# Patient Record
Sex: FEMALE | Race: WHITE
[De-identification: names, ages, dates, MRNs, and addresses within clinical notes are randomized per-mention and may not be internally consistent; named-entity substitution may affect disease eponyms.]

---

## 2017-04-03 ENCOUNTER — HOSPITAL ENCOUNTER (EMERGENCY)
Dept: HOSPITAL 63 - ER | Age: 22
Discharge: HOME | End: 2017-04-03
Payer: SELF-PAY

## 2017-04-03 VITALS — SYSTOLIC BLOOD PRESSURE: 95 MMHG | DIASTOLIC BLOOD PRESSURE: 67 MMHG

## 2017-04-03 VITALS — WEIGHT: 128 LBS | BODY MASS INDEX: 21.33 KG/M2 | HEIGHT: 65 IN

## 2017-04-03 DIAGNOSIS — Z91.018: ICD-10-CM

## 2017-04-03 DIAGNOSIS — N39.0: ICD-10-CM

## 2017-04-03 DIAGNOSIS — R53.81: ICD-10-CM

## 2017-04-03 DIAGNOSIS — Z87.891: ICD-10-CM

## 2017-04-03 DIAGNOSIS — D72.829: ICD-10-CM

## 2017-04-03 DIAGNOSIS — Z88.1: ICD-10-CM

## 2017-04-03 DIAGNOSIS — R10.84: Primary | ICD-10-CM

## 2017-04-03 DIAGNOSIS — N83.209: ICD-10-CM

## 2017-04-03 DIAGNOSIS — R11.2: ICD-10-CM

## 2017-04-03 LAB
ALBUMIN SERPL-MCNC: 3.8 G/DL (ref 3.4–5)
ALBUMIN/GLOB SERPL: 1.1 {RATIO} (ref 1–1.7)
ALP SERPL-CCNC: 59 U/L (ref 46–116)
ALT SERPL-CCNC: 17 U/L (ref 14–59)
ANION GAP SERPL CALC-SCNC: 8 MMOL/L (ref 6–14)
APTT PPP: YELLOW S
AST SERPL-CCNC: 12 U/L (ref 15–37)
BACTERIA #/AREA URNS HPF: (no result) /HPF
BASOPHILS # BLD AUTO: 0 X10^3/UL (ref 0–0.2)
BASOPHILS NFR BLD: 0 % (ref 0–3)
BILIRUB SERPL-MCNC: 0.7 MG/DL (ref 0.2–1)
BILIRUB UR QL STRIP: (no result)
BUN/CREAT SERPL: 17 (ref 6–20)
CA-I SERPL ISE-MCNC: 12 MG/DL (ref 7–20)
CALCIUM SERPL-MCNC: 8.5 MG/DL (ref 8.5–10.1)
CHLORIDE SERPL-SCNC: 106 MMOL/L (ref 98–107)
CO2 SERPL-SCNC: 28 MMOL/L (ref 21–32)
CREAT SERPL-MCNC: 0.7 MG/DL (ref 0.6–1)
EOSINOPHIL NFR BLD: 0 % (ref 0–3)
EOSINOPHIL NFR BLD: 0.1 X10^3/UL (ref 0–0.7)
ERYTHROCYTE [DISTWIDTH] IN BLOOD BY AUTOMATED COUNT: 13.2 % (ref 11.5–14.5)
FIBRINOGEN PPP-MCNC: (no result) MG/DL
GFR SERPLBLD BASED ON 1.73 SQ M-ARVRAT: 105.6 ML/MIN
GLOBULIN SER-MCNC: 3.5 G/DL (ref 2.2–3.8)
GLUCOSE SERPL-MCNC: 89 MG/DL (ref 70–99)
GLUCOSE UR STRIP-MCNC: (no result) MG/DL
HCT VFR BLD CALC: 42.6 % (ref 36–47)
HGB BLD-MCNC: 14.2 G/DL (ref 12–15.5)
LIPASE: 123 U/L (ref 73–393)
LYMPHOCYTES # BLD: 1.4 X10^3/UL (ref 1–4.8)
LYMPHOCYTES NFR BLD AUTO: 11 % (ref 24–48)
MCH RBC QN AUTO: 29 PG (ref 25–35)
MCHC RBC AUTO-ENTMCNC: 33 G/DL (ref 31–37)
MCV RBC AUTO: 89 FL (ref 79–100)
MONO #: 1 X10^3/UL (ref 0–1.1)
MONOCYTES NFR BLD: 8 % (ref 0–9)
NEUT #: 10.7 X10^3UL (ref 1.8–7.7)
NEUTROPHILS NFR BLD AUTO: 81 % (ref 31–73)
NITRITE UR QL STRIP: (no result)
PLATELET # BLD AUTO: 211 X10^3/UL (ref 140–400)
POTASSIUM SERPL-SCNC: 3.9 MMOL/L (ref 3.5–5.1)
PROT SERPL-MCNC: 7.3 G/DL (ref 6.4–8.2)
RBC # BLD AUTO: 4.81 X10^6/UL (ref 3.5–5.4)
RBC #/AREA URNS HPF: (no result) /HPF (ref 0–2)
SODIUM SERPL-SCNC: 142 MMOL/L (ref 136–145)
SP GR UR STRIP: 1.02
SQUAMOUS #/AREA URNS LPF: (no result) /LPF
UROBILINOGEN UR-MCNC: 0.2 MG/DL
WBC # BLD AUTO: 13.2 X10^3/UL (ref 4–11)

## 2017-04-03 PROCEDURE — 36415 COLL VENOUS BLD VENIPUNCTURE: CPT

## 2017-04-03 PROCEDURE — 84703 CHORIONIC GONADOTROPIN ASSAY: CPT

## 2017-04-03 PROCEDURE — 74177 CT ABD & PELVIS W/CONTRAST: CPT

## 2017-04-03 PROCEDURE — 76857 US EXAM PELVIC LIMITED: CPT

## 2017-04-03 PROCEDURE — 81025 URINE PREGNANCY TEST: CPT

## 2017-04-03 PROCEDURE — 96375 TX/PRO/DX INJ NEW DRUG ADDON: CPT

## 2017-04-03 PROCEDURE — 81001 URINALYSIS AUTO W/SCOPE: CPT

## 2017-04-03 PROCEDURE — 83690 ASSAY OF LIPASE: CPT

## 2017-04-03 PROCEDURE — 99285 EMERGENCY DEPT VISIT HI MDM: CPT

## 2017-04-03 PROCEDURE — 80053 COMPREHEN METABOLIC PANEL: CPT

## 2017-04-03 PROCEDURE — 96365 THER/PROPH/DIAG IV INF INIT: CPT

## 2017-04-03 PROCEDURE — 96376 TX/PRO/DX INJ SAME DRUG ADON: CPT

## 2017-04-03 PROCEDURE — 87086 URINE CULTURE/COLONY COUNT: CPT

## 2017-04-03 PROCEDURE — 85027 COMPLETE CBC AUTOMATED: CPT

## 2017-04-03 RX ADMIN — FENTANYL CITRATE PRN MCG: 50 INJECTION, SOLUTION INTRAMUSCULAR; INTRAVENOUS at 15:47

## 2017-04-03 RX ADMIN — FENTANYL CITRATE PRN MCG: 50 INJECTION, SOLUTION INTRAMUSCULAR; INTRAVENOUS at 16:35

## 2017-04-03 NOTE — PHYS DOC
Text


Text


See Dr. Anton chart for details.


US=  No surgical path.  Ovarian Cyst- 4.2 cm no evidence of torsion.  See 

formal report when available. 


Impression:





1. Abdomen Pain


2. Ovarian Cyst


3. UTI


4. Leukocytosis





Stay on clear fluid diet only x 48 hrs.  No milk or solids.  Push fluids. Take 

Keflex 500 tid x 7 days.  Diflucan 100 x 3 days after completed Keflex.   Must 

follow up with primary and review all labs and Xrays.   Return if any concerns 

or persistent pain. 


 (MARTHA RYAN MD)





General


Chief Complaint:  NAUSEA/VOMITING/DIARRHEA


Stated Complaint:  NAUSEA/VOMITING


Time Seen by MD:  14:09


Source:  patient


Exam Limitations:  no limitations


Problems:   (ADRYAN ANTON DO)


Time Seen by MD:  19:44


Problems:   (MARTHA RYAN MD)


History of Present Illness


Initial Comments


Pt is 21/F to ED requesting pregnancy test.


Pt states for past week she's had some generalized abdominal discomfort, now 

located right lower quadrant.  N/V today can't hold anything down, no measured 

temps no diarrhea.  


Pt hoping for positive pregnancy test today, states she's had difficulty 

conceiving in past.  LMP 3/5/17.


Follows at [facility]


Timing/Duration:  1 week


Severity:  mild


Modifying Factors:  worse with eating


Associated Symptoms:  nausea/vomiting, other


 (ADRYAN ANTON DO)


Allergies:  


Coded Allergies:  


     vancomycin (Verified  Allergy, Severe, 4/3/17)


     strawberry (Verified  Allergy, Intermediate, 4/3/17)





Past Medical History


Medical History:  no pertinent history


Surgical History:  noncontributory (R elbow)


LMP (Females 10-50):  3/5/17


 (ADRYAN ANTON DO)





Family History


Significant Family History:  no pertinent family hx


 (ADRYAN ANTON DO)





Social History


Smoker:  quit greater than 1 year


Alcohol:  none


Drugs:  none


 (ADRYAN ANTON DO)





Review of Systems


Constitutional:  denies chills, denies fever,  malaise


Respiratory:  denies cough, denies shortness of breath


Cardiovascular:  denies chest pain, denies palpitations


Gastrointestinal:   see HPI


Genitourinary:  denies dysuria, denies frequency, denies hematuria


Musculoskeletal:  denies back pain, denies joint swelling, denies neck pain


Psychiatric/Neurological:  denies headache, denies numbness, denies paresthesia 

(ADRYAN ANTON DO)





Physical Exam


General Appearance:  WD/WN, no apparent distress


Eyes:  bilateral eye EOMI, bilateral eye PERRL, bilateral eye normal inspection


Ear, Nose, Throat:  hearing grossly normal, normal ENT inspection


Neck:  non-tender, supple


Respiratory:  normal breath sounds, no respiratory distress


Cardiovascular:  normal peripheral pulses, regular rate, rhythm


Gastrointestinal:  other (RLQ TTP with guarding no mass, soft nondistended BS 

diminished)


Rectal:  deferred


Back:  no CVA tenderness, no vertebral tenderness


Extremities:  non-tender, normal inspection


Neurologic/Psychiatric:  CNs II-XII nml as tested, no motor/sensory deficits, 

alert, normal mood/affect, oriented x 3


Skin:  normal color, warm/dry


 (ADRYAN ANTON DO)





Orders, Labs, Meds


Urine pregnancy neg.





UA with SE contamination, await C/S





1538:  Pt will require CT evaluation r/o appy, will have prolonged ED course.





CT reveals 4.7cm L ovarian cyst.  Pt still in pain, nausea controlled.  Will 

check US r/o torsion.


 (ADRYAN ANTON DO)





Departure


Diagnosis:  tobaccoism


Patient Instructions:  Smoking Cessation, Tips For Success





Additional Instructions:


Stop smoking, seek medical assistance if necessary.








ADRYAN ANTON DO Apr 3, 2017 14:41


MARTHA RYAN MD Apr 4, 2017 20:33

## 2017-04-03 NOTE — RAD
PROCEDURE 

Limited left pelvic ultrasound 04/03/2017 

 

HISTORY 

Left pelvic pain. 

 

TECHNIQUE 

A real-time ultrasound examination of the left ovary using the distended 

urinary bladder as a sonographic window was performed. Multiple images 

were obtained. The patient refused transvaginal imaging. 

 

FINDINGS 

The left ovary is mildly enlarged. It measures 5.5 x 5.6 x 4.4 centimeters

in size. A 4.2 centimeter cyst is seen involving the left ovary. Normal 

color flow and pulse Doppler imaging to the left ovary is seen. There is 

no evidence of torsion. No free fluid is noted. 

 

IMPRESSION 

4.2 centimeter cyst is seen involving left ovary. There is no sonographic 

evidence of torsion. 

 

Electronically signed by: Rich Epps MD (Apr 03, 2017 18:56:32)

## 2017-04-03 NOTE — RAD
CT abdomen and pelvis with IV contrast  



History: Right lower quadrant pain with rebound.



Comparison: None.



Technique: After administration of     intravenous contrast, 75 mL  Omnipaque

300, helical CT of the abdomen and pelvis was performed from the lung bases

through the ischial tuberosities. Axial, sagittal, and coronal reconstructions

were obtained.



One or more of the following individualized dose reduction techniques were

utilized for the study:



Automated exposure control

Adjustment of mA and/or kV according to patient's size

Use of iterative reconstruction technique.



Findings:



Evaluation of enteric structures may be limited by lack of oral contrast.



Liver, spleen, pancreas, gallbladder, and bilateral adrenal glands are

unremarkable.  Bilateral kidneys enhance symmetrically.  There is no evidence

of bowel obstruction.  No free air or free fluid is identified in the abdomen

or pelvis. 



Appendix appears within normal limits.  Urinary bladder is unremarkable.  



Uterus and right adnexa are unremarkable. The left ovary demonstrates 4.7 cm

cyst.



Impression:

1.  No evidence of appendicitis. No acute inflammatory process identified in

the abdomen or pelvis.

2.  Left ovary demonstrates 4.7 cm cyst. If there is concern for torsion,

ultrasound could be performed.

## 2019-04-09 ENCOUNTER — HOSPITAL ENCOUNTER (EMERGENCY)
Dept: HOSPITAL 63 - ER | Age: 24
Discharge: HOME | End: 2019-04-09
Payer: OTHER GOVERNMENT

## 2019-04-09 VITALS — HEIGHT: 63 IN | BODY MASS INDEX: 22.32 KG/M2 | WEIGHT: 126 LBS

## 2019-04-09 VITALS — SYSTOLIC BLOOD PRESSURE: 109 MMHG | DIASTOLIC BLOOD PRESSURE: 58 MMHG

## 2019-04-09 DIAGNOSIS — Z88.1: ICD-10-CM

## 2019-04-09 DIAGNOSIS — Z91.018: ICD-10-CM

## 2019-04-09 DIAGNOSIS — B95.0: ICD-10-CM

## 2019-04-09 DIAGNOSIS — M79.10: ICD-10-CM

## 2019-04-09 DIAGNOSIS — E03.9: ICD-10-CM

## 2019-04-09 DIAGNOSIS — F32.9: ICD-10-CM

## 2019-04-09 DIAGNOSIS — F41.9: ICD-10-CM

## 2019-04-09 DIAGNOSIS — J02.0: Primary | ICD-10-CM

## 2019-04-09 LAB
INFLUENZA A PATIENT: NEGATIVE
INFLUENZA B PATIENT: NEGATIVE

## 2019-04-09 PROCEDURE — 87880 STREP A ASSAY W/OPTIC: CPT

## 2019-04-09 PROCEDURE — 87804 INFLUENZA ASSAY W/OPTIC: CPT

## 2019-04-09 PROCEDURE — 99283 EMERGENCY DEPT VISIT LOW MDM: CPT

## 2019-04-09 PROCEDURE — 96372 THER/PROPH/DIAG INJ SC/IM: CPT

## 2019-04-09 NOTE — PHYS DOC
Past History


Past Medical History:  Anxiety, Depression, Hypothyroid


Past Surgical History:  Other


Smoking:  Non-smoker


Alcohol Use:  None


Drug Use:  None





Adult General


Chief Complaint


Chief Complaint:  FLU SYMPTOM





HPI


HPI





Patient is a 23 year old female who presents with sore throat and body aches.  

Patient states that she awoke with throat irritation this morning but no other 

symptoms.  She states she went to work.  Within the last few hours patient 

states that she has developed severe chills, body aches, worsening sore throat, 

and lightheadedness.  No known sick contacts.  Denies any associated shortness 

of breath or cough.  Due to worsening symptoms came to the emergency department 

for further evaluation.  Has not taken any medications to help with her 

symptoms.  Has noticed swollen glands underneath her jaw which are painful when 

touched.





Review of Systems


Review of Systems





Constitutional: Fever, chills[]


Eyes: Denies change in visual acuity, redness, or eye pain []


HENT: Sore throat, swollen glands[]


Respiratory: Denies cough or shortness of breath []


Cardiovascular: Denies chest pain or edema[]


GI: Nausea, denies abdominal pain, vomiting, bloody stools or diarrhea []


: Denies dysuria or hematuria []


Musculoskeletal: Myalgias[]


Integument: Denies rash or skin lesions []


Neurologic: Headache, denies focal weakness or sensory changes []








All other systems were reviewed and found to be within normal limits, except as 

documented in this note.





Current Medications


Current Medications





Current Medications








 Medications


  (Trade)  Dose


 Ordered  Sig/[facility]  Start Time


 Stop Time Status Last Admin


Dose Admin


 


 Dexamethasone


 Sodium Phosphate


  (Decadron)  8 mg  1X  ONCE  4/9/19 16:45


 4/9/19 16:46   





 


 Ketorolac


 Tromethamine


  (Toradol Im)  60 mg  1X  ONCE  4/9/19 16:45


 4/9/19 16:46   





 


 Penicillin G


 Benzathine


  (Bicillin L-A)  1,200,000


 unit  1X  ONCE  4/9/19 16:45


 4/9/19 16:46   














Allergies


Allergies





Allergies








Coded Allergies Type Severity Reaction Last Updated Verified


 


  vancomycin Allergy Severe  4/3/17 Yes


 


  strawberry Allergy Intermediate  4/3/17 Yes











Physical Exam


Physical Exam





Constitutional: Alert, vital signs stable, appears ill. []


HENT: Normocephalic, atraumatic, bilateral external ears normal, oropharynx 

erythematous, bilateral tonsils 1+ with exudates present, soft palate petechial 

hemorrhages present, nose normal. []


Eyes: PERRLA, EOMI, conjunctiva normal, no discharge. [] 


Neck: Normal range of motion, tender anterior cervical lymphadenopathy, supple, 

no stridor. [] 


Cardiovascular: Tachycardiac, regular rhythm, no murmur []


Lungs & Thorax:  Bilateral breath sounds clear to auscultation []


Abdomen: Bowel sounds normal, soft, no tenderness, no masses, no pulsatile 

masses. [] 


Skin: Warm, dry, no erythema, no rash. [] 


Back: No tenderness, no CVA tenderness. [] 


Extremities: No tenderness, no cyanosis, no clubbing, ROM intact, no edema. [] 


Neurologic: Alert and oriented X 3, normal motor function, normal sensory 

function, no focal deficits noted. []





Current Patient Data


Vital Signs





 Vital Signs








  Date Time  Temp Pulse Resp B/P (MAP) Pulse Ox O2 Delivery O2 Flow Rate FiO2


 


4/9/19 16:28 99.0 106 18  100 Room Air  








Lab Results





 


                           Laboratory Tests








Test


 4/9/19


16:33


 


Influenza Type A (Rapid) Negative 


 


Influenza Type B (Rapid) Negative 


 


Group A Streptococcus Rapid Positive 








                           Current Medications








 Medications


  (Trade)  Dose


 Ordered  Sig/[facility]


 Route


 PRN Reason  Start Time


 Stop Time Status Last Admin


Dose Admin


 


 Penicillin G


 Benzathine


  (Bicillin L-A)  1,200,000


 unit  1X  ONCE


 IM


   4/9/19 16:45


 4/9/19 16:46 DC 4/9/19 16:51





 


 Ketorolac


 Tromethamine


  (Toradol Im)  60 mg  1X  ONCE


 IM


   4/9/19 16:45


 4/9/19 16:46 DC 4/9/19 16:52





 


 Dexamethasone


 Sodium Phosphate


  (Decadron)  8 mg  1X  ONCE


 PO


   4/9/19 16:45


 4/9/19 16:46 DC 4/9/19 16:45














EKG


EKG


Not performed[]





Radiology/Procedures


Radiology/Procedures


Not performed[]





Course & Med Decision Making


Course & Med Decision Making


Pertinent Labs and Imaging studies reviewed. (See chart for details)





Rapid strep test was found to be positive in the emergency department.  Patient 

has elected to receive Bicillin LA which was given in the emergency department.

  Patient given by mouth Decadron to help with throat pain and given IM Toradol 

for treatment of body aches.  Advised patient to continue on Tylenol and 

ibuprofen for continued symptomatic treatment.  Also recommended warm fluids 

and rest.  Advised follow-up with primary doctor in the next 5 days if symptoms 

are not improving and return to the emergency department for any worsening 

symptoms.  Patient was understanding and in agreement with treatment plan.[]





Dragon Disclaimer


Dragon Disclaimer


This electronic medical record was generated, in whole or in part, using a 

voice recognition dictation system.





Departure


Departure:


Impression:  


 Primary Impression:  


 Strep pharyngitis


Disposition:  01 HOME, SELF-CARE


Condition:  IMPROVED


Referrals:  


MAXWELL MORENO (PCP)


Patient Instructions:  Strep Throat





Additional Instructions:  


Continue treatment of symptoms at home with Tylenol and ibuprofen.  You may 

also treat sore throat symptoms with warm saltwater gargle as needed.  Be sure 

to drink plenty of fluids and rest until your symptoms have resolved.  Follow-

up with your primary doctor in 5 days if you have not noted any improvement in 

your symptoms.  Return to the emergency department for any worsening symptoms.


Scripts


Ibuprofen (IBUPROFEN) 600 Mg Tablet


600 MG PO Q6HRS, #30 TAB


   May also use for body aches and pain


   Prov: ANNABEL MCMAHAN MD         4/9/19











ANNABEL MCMAHAN MD Apr 9, 2019 16:47